# Patient Record
Sex: FEMALE | Race: WHITE | NOT HISPANIC OR LATINO | URBAN - METROPOLITAN AREA
[De-identification: names, ages, dates, MRNs, and addresses within clinical notes are randomized per-mention and may not be internally consistent; named-entity substitution may affect disease eponyms.]

---

## 2023-06-03 LAB — GROUP B STREP SCREEN: NORMAL

## 2023-12-08 ENCOUNTER — TELEPHONE (OUTPATIENT)
Dept: OBSTETRICS AND GYNECOLOGY | Facility: CLINIC | Age: 33
End: 2023-12-08

## 2023-12-11 ENCOUNTER — OFFICE VISIT (OUTPATIENT)
Dept: OBSTETRICS AND GYNECOLOGY | Facility: CLINIC | Age: 33
End: 2023-12-11
Payer: COMMERCIAL

## 2023-12-11 VITALS
DIASTOLIC BLOOD PRESSURE: 80 MMHG | WEIGHT: 164 LBS | BODY MASS INDEX: 22.21 KG/M2 | HEIGHT: 72 IN | SYSTOLIC BLOOD PRESSURE: 120 MMHG

## 2023-12-11 DIAGNOSIS — O92.70 LACTATION PROBLEM (HHS-HCC): Primary | ICD-10-CM

## 2023-12-11 PROCEDURE — 1036F TOBACCO NON-USER: CPT | Performed by: ADVANCED PRACTICE MIDWIFE

## 2023-12-11 PROCEDURE — 99213 OFFICE O/P EST LOW 20 MIN: CPT | Performed by: ADVANCED PRACTICE MIDWIFE

## 2023-12-11 ASSESSMENT — ENCOUNTER SYMPTOMS
GASTROINTESTINAL NEGATIVE: 0
CARDIOVASCULAR NEGATIVE: 0
RESPIRATORY NEGATIVE: 0
ENDOCRINE NEGATIVE: 0
NEUROLOGICAL NEGATIVE: 0
ALLERGIC/IMMUNOLOGIC NEGATIVE: 0
CONSTITUTIONAL NEGATIVE: 0
PSYCHIATRIC NEGATIVE: 0
MUSCULOSKELETAL NEGATIVE: 0
EYES NEGATIVE: 0
HEMATOLOGIC/LYMPHATIC NEGATIVE: 0

## 2023-12-11 NOTE — PROGRESS NOTES
GYN Problem note  Mony Campbell  is a 33 y.o. who presents with   Chief Complaint   Patient presents with    Breast Problem     Had a baby in June, has been breastfeeding. Nipple is turning yellow. Noticed on Thursday. No itching or pain. No issues with breast feeding.        Pt reports a hx of bilateral breast implants.      Physical Exam   Physical Exam  Constitutional:       Appearance: Normal appearance.   HENT:      Head: Normocephalic and atraumatic.   Cardiovascular:      Rate and Rhythm: Normal rate.   Pulmonary:      Effort: Pulmonary effort is normal.   Chest:   Breasts:     Right: Skin change present.      Left: Normal.          Comments: Pale yellow discoloration   Possibly bruised area on proximal side of right areola    Musculoskeletal:         General: Normal range of motion.      Cervical back: Normal range of motion.   Skin:     General: Skin is warm.   Neurological:      General: No focal deficit present.      Mental Status: She is alert and oriented to person, place, and time.   Psychiatric:         Mood and Affect: Mood normal.         Behavior: Behavior normal.        Visit Vitals  /80            Assessment/Plan   Discoloration of areola  Will continue to monitor (picture taken with pt's phone)  If not any worse (bigger, darker, more defined) in next 5 days, continue to monitor  If worse, call office, will order ultrasound.

## 2023-12-27 ENCOUNTER — OFFICE VISIT (OUTPATIENT)
Dept: VASCULAR SURGERY | Facility: CLINIC | Age: 33
End: 2023-12-27
Payer: COMMERCIAL

## 2023-12-27 VITALS
BODY MASS INDEX: 22.38 KG/M2 | WEIGHT: 163.9 LBS | SYSTOLIC BLOOD PRESSURE: 134 MMHG | DIASTOLIC BLOOD PRESSURE: 82 MMHG | HEART RATE: 109 BPM

## 2023-12-27 DIAGNOSIS — I86.8: Primary | ICD-10-CM

## 2023-12-27 PROCEDURE — 99204 OFFICE O/P NEW MOD 45 MIN: CPT | Performed by: SURGERY

## 2023-12-27 PROCEDURE — 1036F TOBACCO NON-USER: CPT | Performed by: SURGERY

## 2023-12-27 ASSESSMENT — ENCOUNTER SYMPTOMS
DEPRESSION: 0
LOSS OF SENSATION IN FEET: 0
OCCASIONAL FEELINGS OF UNSTEADINESS: 0

## 2023-12-27 NOTE — PROGRESS NOTES
NPV REASON: vaginal varicosity     CURRENT ENCOUNTER:  Mony Campbell is 33 y.o. female here for follow up of vaginal varicosity.     Engorged with pregnancy - had some scabs and bleeding over the prominent areas. Compared it to a nose bleed type rate; held pressure and ultimately stops - had 2-3 times of these events with last pregnancy - now has improved a bit now.     No bleeding since delivery  Maybe started as a trauma - in 4th grade - trauma with a chair to perineum and thinks noted some changes since that time.   During menstrual cycle - hurts more  Sometimes clothing irritates it or increased activity -  Occassional with intercourse but no post-coital pain.   No pelvic pain    First child was 2020 - .     Last delivery was  2023  Repeat   Discharged POD #2    Remains breast feeding and part formula;   Wishes for more children - aiming for 4    PastMedHX:  No past medical history on file.    Meds:   No current outpatient medications on file.    Allergies:   Allergies   Allergen Reactions    Gluten GI Upset    Latex Swelling       ROS:  Review of Systems   Otherwise unremarkable    Objective:  Vitals:  Vitals:    23 0918   BP: 134/82   Pulse: 109        Exam:  No distress  Breathing comfortably  Abd soft, nt, nd  Legs wihtout edema  Feet warm and well perfused  B/l radial pulses intact and symmetric  Neuro intact x 4  Healed c -section scar  No other vaginal external abnormalities  apart from that to right labia    Physical Exam  Exam conducted with a chaperone present.   Genitourinary:     Exam position: Lithotomy position.      Labia:         Right: Lesion present.          Labs:  Lab Results   Component Value Date    WBC 10.9 2023    WBC CANCELED 2023    WBC 6.8 2023    HGB 9.1 (L) 2023    HGB CANCELED 2023    HGB 11.2 (L) 2023    HCT 29.0 (L) 2023    HCT CANCELED 2023    HCT 35.4 (L) 2023    MCV 94 2023    MCV  "CANCELED 06/21/2023    MCV 94 06/20/2023     (L) 06/21/2023     No results found for: \"CREATININE\", \"BUN\", \"NA\", \"K\", \"CL\", \"CO2\"      Imaging:  None at this time    Assessment & Plan:  Mony Campbell is 33 y.o. female with history of bleeding right labial varicosities with 2nd pregnancy who presents for evaluation.       1) WE WILL proceed with a trans-abdominal duplex/ultrasound to evaluate for pelvic venous disease  2) start a daily regimen for constipation with a mix of miralax and increase fiber foods  3) I can see you back with the study on the same day to discuss results/plans      Froilan Rockwell MD, MHS, RPVI  , [facility] [facility] [employer]  Director, Center for Comprehensive Venous Care, [facility] [place]  Co-Director, Vascular Laboratories, [facility] [place]  Division of Vascular Surgery and Endovascular Therapy  [facility]            "

## 2023-12-27 NOTE — PATIENT INSTRUCTIONS
It was a pleasure taking care of you today and appreciate your seeing us at our [facility].     Today's plan is as follows:  1) WE WILL proceed with a trans-abdominal duplex/ultrasound to evaluate for pelvic venous disease  2) start a daily regimen for constipation with a mix of miralax and increase fiber foods  3) I can see you back with the study on the same day to discuss results/plans      Please call the office with any questions at [phone].   You can speak to our secretaries or our clinical nurses for specific questions.   For [facility] specific questions, you can also call [phone] or email at [email or web address]  If you need coordinating your appointments and testing you can do these at the  or by calling my office shortly after your visit.

## 2024-01-10 ENCOUNTER — APPOINTMENT (OUTPATIENT)
Dept: VASCULAR MEDICINE | Facility: CLINIC | Age: 34
End: 2024-01-10
Payer: COMMERCIAL

## 2024-02-27 ENCOUNTER — OFFICE VISIT (OUTPATIENT)
Dept: VASCULAR SURGERY | Facility: CLINIC | Age: 34
End: 2024-02-27
Payer: COMMERCIAL

## 2024-02-27 ENCOUNTER — APPOINTMENT (OUTPATIENT)
Dept: VASCULAR MEDICINE | Facility: CLINIC | Age: 34
End: 2024-02-27
Payer: COMMERCIAL

## 2024-02-27 ENCOUNTER — HOSPITAL ENCOUNTER (OUTPATIENT)
Dept: VASCULAR MEDICINE | Facility: CLINIC | Age: 34
Discharge: HOME | End: 2024-02-27
Payer: COMMERCIAL

## 2024-02-27 ENCOUNTER — APPOINTMENT (OUTPATIENT)
Dept: VASCULAR SURGERY | Facility: CLINIC | Age: 34
End: 2024-02-27
Payer: COMMERCIAL

## 2024-02-27 VITALS — SYSTOLIC BLOOD PRESSURE: 124 MMHG | HEART RATE: 105 BPM | DIASTOLIC BLOOD PRESSURE: 68 MMHG | RESPIRATION RATE: 18 BRPM

## 2024-02-27 DIAGNOSIS — I86.8: Primary | ICD-10-CM

## 2024-02-27 DIAGNOSIS — I86.8: ICD-10-CM

## 2024-02-27 DIAGNOSIS — I83.891 VARICOSE VEINS OF RIGHT LOWER EXTREMITY WITH OTHER COMPLICATIONS: ICD-10-CM

## 2024-02-27 DIAGNOSIS — I83.899 BLEEDING FROM VARICOSE VEIN: ICD-10-CM

## 2024-02-27 PROCEDURE — 99214 OFFICE O/P EST MOD 30 MIN: CPT | Performed by: SURGERY

## 2024-02-27 PROCEDURE — 93978 VASCULAR STUDY: CPT | Performed by: SURGERY

## 2024-02-27 PROCEDURE — 1036F TOBACCO NON-USER: CPT | Performed by: SURGERY

## 2024-02-27 PROCEDURE — 93978 VASCULAR STUDY: CPT

## 2024-02-27 PROCEDURE — 99214 OFFICE O/P EST MOD 30 MIN: CPT | Mod: 25 | Performed by: SURGERY

## 2024-02-27 ASSESSMENT — ENCOUNTER SYMPTOMS
LOSS OF SENSATION IN FEET: 0
OCCASIONAL FEELINGS OF UNSTEADINESS: 0
DEPRESSION: 0

## 2024-02-27 NOTE — PROGRESS NOTES
"F/U REASON: Right labial varicosities, pain, history of bleeding    CURRENT ENCOUNTER:  Mony Campbell is 33 y.o. female here for follow up of Right labial varicosities, pain, history of bleeding.    Patient here for follow up. Had her duplex without any pelvic source to her vaginal vv - remains with fullness and may be a bit more prominent. No interval bleeding  Did discuss sclerotherapy, fluoro guided in the OR with need to proceed once she is done with breast feeding. She continues to wish for a total of 4 children - currently has 2  and thinking maybe summer (July) maybe a time for her and her spouse to plan the next phase of their family growth.     She wishes to discuss options for therapy with  before making a decision.     Meds:   No current outpatient medications on file.    Allergies:   Allergies   Allergen Reactions    Gluten GI Upset    Latex Swelling       ROS:  Review of Systems    otherwise unremarkable    Objective:  Vitals:  Vitals:    02/27/24 1028   BP: 124/68   Pulse: 105   Resp: 18        Exam:  Labial vv on right with clustered pattern.        Labs:  Lab Results   Component Value Date    WBC 10.9 06/21/2023    WBC CANCELED 06/21/2023    WBC 6.8 06/20/2023    HGB 9.1 (L) 06/21/2023    HGB CANCELED 06/21/2023    HGB 11.2 (L) 06/20/2023    HCT 29.0 (L) 06/21/2023    HCT CANCELED 06/21/2023    HCT 35.4 (L) 06/20/2023    MCV 94 06/21/2023    MCV CANCELED 06/21/2023    MCV 94 06/20/2023     (L) 06/21/2023     No results found for: \"CREATININE\", \"BUN\", \"NA\", \"K\", \"CL\", \"CO2\"      Imaging:  Reviewed vascular lab;   QOL scales also filled out and scanned in     Assessment & Plan:  Mony Campbell is 33 y.o. female here for follow up of Right labial varicosities, pain, history of bleeding.    Patient here for follow up. Had her duplex without any pelvic source to her vaginal vv - remains with fullness and may be a bit more prominent. No interval bleeding  Did discuss sclerotherapy, " fluoro guided in the OR with need to proceed once she is done with breast feeding. She continues to wish for a total of 4 children - currently has 2  and thinking maybe summer (July) maybe a time for her and her spouse to plan the next phase of their family growth.     She wishes to discuss options for therapy with  before making a decision.     Froilan Rockwell MD, MHS, RPVI  , [facility] [employer]  Director, Center for Comprehensive Venous Care, [facility] [place]  Co-Director, Vascular Laboratories, [facility] [place]  Division of Vascular Surgery and Endovascular Therapy  [facility]

## 2024-02-28 ENCOUNTER — APPOINTMENT (OUTPATIENT)
Dept: VASCULAR SURGERY | Facility: CLINIC | Age: 34
End: 2024-02-28
Payer: COMMERCIAL

## 2024-02-28 ENCOUNTER — APPOINTMENT (OUTPATIENT)
Dept: VASCULAR MEDICINE | Facility: CLINIC | Age: 34
End: 2024-02-28
Payer: COMMERCIAL

## 2024-03-08 ENCOUNTER — OFFICE VISIT (OUTPATIENT)
Dept: OBSTETRICS AND GYNECOLOGY | Facility: CLINIC | Age: 34
End: 2024-03-08
Payer: COMMERCIAL

## 2024-03-08 VITALS
HEIGHT: 71 IN | SYSTOLIC BLOOD PRESSURE: 116 MMHG | DIASTOLIC BLOOD PRESSURE: 80 MMHG | WEIGHT: 163.6 LBS | BODY MASS INDEX: 22.9 KG/M2

## 2024-03-08 DIAGNOSIS — Z01.419 ENCOUNTER FOR ANNUAL ROUTINE GYNECOLOGICAL EXAMINATION: Primary | ICD-10-CM

## 2024-03-08 PROCEDURE — 99395 PREV VISIT EST AGE 18-39: CPT | Performed by: OBSTETRICS & GYNECOLOGY

## 2024-03-08 PROCEDURE — 1036F TOBACCO NON-USER: CPT | Performed by: OBSTETRICS & GYNECOLOGY

## 2024-03-08 ASSESSMENT — PATIENT HEALTH QUESTIONNAIRE - PHQ9
1. LITTLE INTEREST OR PLEASURE IN DOING THINGS: NOT AT ALL
2. FEELING DOWN, DEPRESSED OR HOPELESS: NOT AT ALL
SUM OF ALL RESPONSES TO PHQ9 QUESTIONS 1 & 2: 0

## 2024-03-08 NOTE — PROGRESS NOTES
"Annual Exam    Pap: 2022 nml  Angelique: Never  LMP: 2024  CC: wants to try for another baby later this year, has questions.     Carol Francisco MA II     GYN ANNUAL EXAM    SUBJECTIVE    Mony Campbell is a 33 y.o. female who presents for annual exam today.  Her periods are monthly and regular.  She is using condoms for contraception and is happy with this.  She has no complaints today.      PMH - None     PSH - c/s x 2     OB history -   OB History    Para Term  AB Living   2 2 2     2   SAB IAB Ectopic Multiple Live Births           2      # Outcome Date GA Lbr Armin/2nd Weight Sex Delivery Anes PTL Lv   2 Term      CS-LTranv   KYARA   1 Term      CS-LTranv   KYARA     Last pap -   Normal HPV Not done-      Last mammogram - not indicated     Family history of breast or ovarian cancer - none     OBJECTIVE  /80   Ht 1.803 m (5' 11\")   Wt 74.2 kg (163 lb 9.6 oz)   LMP 2024 (Exact Date)   Breastfeeding No   BMI 22.82 kg/m²     General Appearance   - consistent with stated age, well groomed and cooperative    Integumentary  - skin warm and dry without rash    Head and Neck  - normalocephalic and neck supple    Chest and Lung Exam  - normal breathing effort, no respiratory distress    Breast  - symmetry noted, no mass palpable, no skin change and no nipple discharge.    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  female who presents for annual exam.       Actions performed during this visit include:  - Clinical breast exam  - Clinical pelvic exam  - Pap- UTD - will be due again next year.  - STI " screening  - Declines  - Right vulvar congenital venous malformation - saw vascular surgery who offered her treatment.  At this time, she likely plans to defer treatment until she has completed her childbearing.  Plans to have 1-2 more children.      Please return for your next visit in 1 year or sooner as needed.     Sherly Barba MD

## 2024-03-17 ENCOUNTER — APPOINTMENT (OUTPATIENT)
Dept: URGENT CARE | Facility: CLINIC | Age: 34
End: 2024-03-17
Payer: COMMERCIAL

## 2025-04-03 ENCOUNTER — OFFICE VISIT (OUTPATIENT)
Dept: URGENT CARE | Age: 35
End: 2025-04-03
Payer: COMMERCIAL

## 2025-04-03 VITALS
TEMPERATURE: 98.1 F | BODY MASS INDEX: 21.62 KG/M2 | HEART RATE: 93 BPM | OXYGEN SATURATION: 98 % | SYSTOLIC BLOOD PRESSURE: 127 MMHG | DIASTOLIC BLOOD PRESSURE: 82 MMHG | WEIGHT: 155 LBS | RESPIRATION RATE: 16 BRPM

## 2025-04-03 DIAGNOSIS — R10.9 STOMACH PAIN: ICD-10-CM

## 2025-04-03 DIAGNOSIS — J21.0 RSV (ACUTE BRONCHIOLITIS DUE TO RESPIRATORY SYNCYTIAL VIRUS): ICD-10-CM

## 2025-04-03 DIAGNOSIS — J34.9 SINUS PROBLEM: ICD-10-CM

## 2025-04-03 DIAGNOSIS — B34.8 RHINOVIRUS: Primary | ICD-10-CM

## 2025-04-03 LAB
POC APPEARANCE, URINE: CLEAR
POC BILIRUBIN, URINE: NEGATIVE
POC BLOOD, URINE: NEGATIVE
POC COLOR, URINE: YELLOW
POC CORONAVIRUS SARS-COV-2 PCR: NEGATIVE
POC GLUCOSE, URINE: NEGATIVE MG/DL
POC HUMAN RHINOVIRUS PCR: POSITIVE
POC INFLUENZA A VIRUS PCR: NEGATIVE
POC INFLUENZA B VIRUS PCR: NEGATIVE
POC KETONES, URINE: NEGATIVE MG/DL
POC LEUKOCYTES, URINE: NEGATIVE
POC NITRITE,URINE: NEGATIVE
POC PH, URINE: 6 PH
POC PROTEIN, URINE: NEGATIVE MG/DL
POC RESPIRATORY SYNCYTIAL VIRUS PCR: POSITIVE
POC SPECIFIC GRAVITY, URINE: 1.02
POC UROBILINOGEN, URINE: 0.2 EU/DL
PREGNANCY TEST URINE, POC: NEGATIVE

## 2025-04-03 PROCEDURE — 99214 OFFICE O/P EST MOD 30 MIN: CPT | Performed by: FAMILY MEDICINE

## 2025-04-03 PROCEDURE — 81003 URINALYSIS AUTO W/O SCOPE: CPT | Performed by: FAMILY MEDICINE

## 2025-04-03 PROCEDURE — 87631 RESP VIRUS 3-5 TARGETS: CPT | Performed by: FAMILY MEDICINE

## 2025-04-03 PROCEDURE — 81025 URINE PREGNANCY TEST: CPT | Performed by: FAMILY MEDICINE

## 2025-04-03 PROCEDURE — 1036F TOBACCO NON-USER: CPT | Performed by: FAMILY MEDICINE

## 2025-04-03 RX ORDER — BENZONATATE 200 MG/1
200 CAPSULE ORAL 3 TIMES DAILY PRN
Qty: 21 CAPSULE | Refills: 0 | Status: SHIPPED | OUTPATIENT
Start: 2025-04-03 | End: 2025-05-03

## 2025-04-03 RX ORDER — IPRATROPIUM BROMIDE 42 UG/1
2 SPRAY, METERED NASAL 3 TIMES DAILY
Qty: 15 ML | Refills: 0 | Status: SHIPPED | OUTPATIENT
Start: 2025-04-03 | End: 2025-04-08

## 2025-04-03 ASSESSMENT — ENCOUNTER SYMPTOMS
HEADACHES: 1
ABDOMINAL PAIN: 1
SINUS COMPLAINT: 1
COUGH: 1

## 2025-04-03 NOTE — PROGRESS NOTES
Subjective   Patient ID: Mony Campbell is a 34 y.o. female. They present today with a chief complaint of Sinus Problem, Nasal Congestion, Cough, Headache, Earache, and Abdominal Pain.    History of Present Illness  34-year-old female presents with 5 to 7 days initially some malaise and fatigue within the last 2 days having nasal drainage rhinorrhea.  Highest temperature was 99.  Feeling hot and cold.  Some early morning sore throat.  Some laryngitis.  Rare productive cough.  Kids been sick as well.  She not take any medications for symptoms.  Last menstrual cycle was 2.5 weeks ago as her and her  are actively trying to conceive      Sinus Problem  Associated symptoms: abdominal pain, cough, ear pain and headaches    Cough  Associated symptoms include ear pain and headaches.   Headache  Associated symptoms: abdominal pain, cough and ear pain    Earache   Associated symptoms include abdominal pain, coughing and headaches.   Abdominal Pain  Associated symptoms include headaches.       Past Medical History  Allergies as of 2025 - Reviewed 2025   Allergen Reaction Noted    Gluten GI Upset 2023    Latex Swelling 2023       (Not in a hospital admission)       No past medical history on file.    Past Surgical History:   Procedure Laterality Date    OTHER SURGICAL HISTORY  2022     section        reports that she has never smoked. She has never used smokeless tobacco. She reports that she does not drink alcohol and does not use drugs.    Review of Systems  Review of Systems   HENT:  Positive for ear pain.    Respiratory:  Positive for cough.    Gastrointestinal:  Positive for abdominal pain.   Neurological:  Positive for headaches.                                  Objective    Vitals:    25 1714   BP: 127/82   Pulse: 93   Resp: 16   Temp: 36.7 °C (98.1 °F)   SpO2: 98%   Weight: 70.3 kg (155 lb)     No LMP recorded.    Physical Exam    General- No apparent distress,  well appearing  Cardiovascular- regular rate and rhythm, no gallops, murmurs or rubs  Lungs- clear to auscultation bilaterally, no wheezing or rhonchi  ENT- Normal bilateral tympanic membranes, normal pharynx      Procedures    Point of Care Test & Imaging Results from this visit  Results for orders placed or performed in visit on 04/03/25   POCT UA Automated manually resulted   Result Value Ref Range    POC Color, Urine Yellow Straw, Yellow, Light-Yellow    POC Appearance, Urine Clear Clear    POC Glucose, Urine NEGATIVE NEGATIVE mg/dl    POC Bilirubin, Urine NEGATIVE NEGATIVE    POC Ketones, Urine NEGATIVE NEGATIVE mg/dl    POC Specific Gravity, Urine 1.025 1.005 - 1.035    POC Blood, Urine NEGATIVE NEGATIVE    POC PH, Urine 6.0 No Reference Range Established PH    POC Protein, Urine NEGATIVE NEGATIVE mg/dl    POC Urobilinogen, Urine 0.2 0.2, 1.0 EU/DL    Poc Nitrite, Urine NEGATIVE NEGATIVE    POC Leukocytes, Urine NEGATIVE NEGATIVE      Imaging  No results found.    Cardiology, Vascular, and Other Imaging  No other imaging results found for the past 2 days      Diagnostic study results (if any) were reviewed by Tobias Causey MD.    Assessment/Plan   Allergies, medications, history, and pertinent labs/EKGs/Imaging reviewed by Tobias Causey MD.     Medical Decision Making  Rhinorrhea- unfortunately patient tested positive for both RSV and rhinovirus today.  Advised supportive care with Tessalon and nasal ipratropium follow-up with PCP.    Orders and Diagnoses  Diagnoses and all orders for this visit:  Sinus problem  -     POCT SPOTFIRE R/ST Panel Mini w/COVID ([facility]) manually resulted  Stomach pain  -     POCT UA Automated manually resulted  -     POCT pregnancy, urine manually resulted      Medical Admin Record      Patient disposition: Home    Electronically signed by Tobias Causey MD  5:47 PM

## 2025-04-03 NOTE — LETTER
April 3, 2025     Patient: Mony Campbell   YOB: 1990   Date of Visit: 4/3/2025       To Whom It May Concern:    It is my medical opinion that Mony Campbell may return to work on 4/5/25 .    If you have any questions or concerns, please don't hesitate to call.         Sincerely,        Tobias Causey MD    CC: No Recipients

## 2025-05-16 ENCOUNTER — APPOINTMENT (OUTPATIENT)
Dept: OBSTETRICS AND GYNECOLOGY | Facility: CLINIC | Age: 35
End: 2025-05-16
Payer: COMMERCIAL

## 2025-05-16 VITALS — WEIGHT: 152 LBS | SYSTOLIC BLOOD PRESSURE: 130 MMHG | DIASTOLIC BLOOD PRESSURE: 84 MMHG | BODY MASS INDEX: 21.2 KG/M2

## 2025-05-16 DIAGNOSIS — Z98.891 HISTORY OF 2 CESAREAN SECTIONS: ICD-10-CM

## 2025-05-16 DIAGNOSIS — Z3A.09 9 WEEKS GESTATION OF PREGNANCY (HHS-HCC): Primary | ICD-10-CM

## 2025-05-16 DIAGNOSIS — Q27.9 VENOUS MALFORMATION (HHS-HCC): ICD-10-CM

## 2025-05-16 ASSESSMENT — EDINBURGH POSTNATAL DEPRESSION SCALE (EPDS)
I HAVE BEEN SO UNHAPPY THAT I HAVE BEEN CRYING: NO, NEVER
THE THOUGHT OF HARMING MYSELF HAS OCCURRED TO ME: NEVER
I HAVE BEEN SO UNHAPPY THAT I HAVE HAD DIFFICULTY SLEEPING: NOT AT ALL
I HAVE FELT SAD OR MISERABLE: NO, NOT AT ALL
I HAVE BEEN ABLE TO LAUGH AND SEE THE FUNNY SIDE OF THINGS: AS MUCH AS I ALWAYS COULD
I HAVE LOOKED FORWARD WITH ENJOYMENT TO THINGS: AS MUCH AS I EVER DID
I HAVE BLAMED MYSELF UNNECESSARILY WHEN THINGS WENT WRONG: NO, NEVER
I HAVE BEEN ANXIOUS OR WORRIED FOR NO GOOD REASON: YES, SOMETIMES
THINGS HAVE BEEN GETTING ON TOP OF ME: NO, I HAVE BEEN COPING AS WELL AS EVER
I HAVE FELT SCARED OR PANICKY FOR NO GOOD REASON: NO, NOT AT ALL
TOTAL SCORE: 2

## 2025-05-16 NOTE — PROGRESS NOTES
Initial OB visit.  LMP: 3/12/2025  C/O: Had light spotting around a week after her last period.   Nausea, no vomiting.  SOB, fatigued easily.    Routine prenatal visit     Subjective    HPI:  34 y.o.  at 9.2 weeks gestational age by sure LMP.  Well-known to me from prior pregnancies.  Planned and desired pregnancy.  USN done today - CRL c/w LMP dating.  EDC per LMP - .  PMH uncomplicated. OB history significant for 2 previous  sections.  Planning RCS.  Has congenital vulvar varicosity in right labia majora - has seen vascular and will likely have it treated once she has completed childbearing.   Has nausea but tolerating.  Taking OTC PNV.    BMI today Body mass index is 21.2 kg/m². Discussed optimal weight gain in pregnancy.  Discussed genetic screening options and carrier screening - declines.  Discussed ASA - will likely decline but will talk about it again next visit.  13 week USN discussed.  Order placed and she will call to schedule. Pt aware of collaborative care model and group practice from prior pregnancy.  Questions answered.    Objective    Vital Signs  /84   Wt 68.9 kg (152 lb)   LMP 2025   BMI 21.20 kg/m²     Mony Campbell is a 34 y.o. yo  at 9w2d here for the following concerns which we addressed today:     Medical Problems       Problem List       9 weeks gestation of pregnancy ([facility])    Overview Addendum 2025  4:45 PM by Sherly Barba MD   Desired provider in labor: [] CNM  [x] Physician   [] Either Acceptable  [x] Blood Products: [x] Yes, accepts [] No, needs counseling  [x] Initial BMI: 21   [] Prenatal Labs: <> next visit   [x] Cervical Cancer Screening up to date: 2022 - normal, negative HPV   [x] Rh status: positive   [x] Screen for IPV and Substance Use Risk  [x] Genetic Screening (cfDNA):  Declines   [] First Trimester Anatomy Screen (11-13.6 wks): <> order placed and she will call to schedule   [] Baby ASA: Discussed - pt will  likely decline - discuss again next visit   [x] Pregnancy dated by: LMP c/w 9 week USN     [] Anatomy US: (19-20 wks)  [] Federal Sterilization consent signed (if indicated):  [] 1hr GCT at 24-28wks:  [] Rhogam (if indicated):   [] Fetal Surveillance (if indicated):  [] Tdap (27-32 wks, may be given up to 36 wks if initial window missed):   [] RSV (32-36 wks) (Sept. to end ):     [] Feeding Intentions:  [] Postpartum Birth control method:   [] GBS at 36 - 37 wks:  [] 39 weeks discussion of IOL vs. Expectant management:  [] Mode of delivery ( anticipated ):           History of 2  sections    Overview Signed 2025  4:44 PM by Sherly Barba MD   <> schedule RCS          Venous malformation ([facility])    Overview Signed 2025  4:45 PM by Sherly Barba MD   - right labia majora   - saw vascular - will likely pursue treatment once she has completed childbearing              Follow up in 4 week(s).

## 2025-05-18 LAB
BACTERIA UR CULT: NORMAL
C TRACH RRNA SPEC QL NAA+PROBE: NOT DETECTED
N GONORRHOEA RRNA SPEC QL NAA+PROBE: NOT DETECTED
QUEST GC CT AMPLIFIED (ALWAYS MESSAGE): NORMAL

## 2025-05-19 ENCOUNTER — TELEPHONE (OUTPATIENT)
Dept: OBSTETRICS AND GYNECOLOGY | Facility: HOSPITAL | Age: 35
End: 2025-05-19
Payer: COMMERCIAL

## 2025-05-19 NOTE — TELEPHONE ENCOUNTER
Patient paged answering service with bleeding. Briefly, 34 year old  at 9w5d by LMP c/w office scan three days ago. Reports spotting when she wiped just now. Did have intercourse last night. No cramping. Confirmed IUP with [facility] three days ago. Rh positive. Okay to monitor at home for now with bleeding precautions provided. Encouraged to page back should bleeding persist/become heavier. All questions answered    Berenice Morris MD

## 2025-06-13 ENCOUNTER — APPOINTMENT (OUTPATIENT)
Dept: OBSTETRICS AND GYNECOLOGY | Facility: CLINIC | Age: 35
End: 2025-06-13
Payer: COMMERCIAL

## 2025-06-13 VITALS — SYSTOLIC BLOOD PRESSURE: 120 MMHG | DIASTOLIC BLOOD PRESSURE: 84 MMHG | BODY MASS INDEX: 21.34 KG/M2 | WEIGHT: 153 LBS

## 2025-06-13 DIAGNOSIS — Z3A.13 13 WEEKS GESTATION OF PREGNANCY (HHS-HCC): Primary | ICD-10-CM

## 2025-06-13 DIAGNOSIS — Z34.91 PRENATAL CARE IN FIRST TRIMESTER, UNSPECIFIED GRAVIDITY: ICD-10-CM

## 2025-06-13 DIAGNOSIS — Q27.9 VENOUS MALFORMATION (HHS-HCC): ICD-10-CM

## 2025-06-13 DIAGNOSIS — Z98.891 HISTORY OF 2 CESAREAN SECTIONS: ICD-10-CM

## 2025-06-13 PROCEDURE — 0501F PRENATAL FLOW SHEET: CPT | Performed by: OBSTETRICS & GYNECOLOGY

## 2025-06-13 NOTE — PROGRESS NOTES
Routine prenatal visit     Subjective    HPI:  No complaints today.  Has noticed a varicose vein in her left leg.  Has not scheduled a 13 week USN - reminded her about this and she will try to call and get this scheduled ASAP.  Routine labs today.  Declines carrier/cfDNA screening.  Discussed recommendation for ASA for preeclampsia prevention - she declines.     Objective    Vital Signs  /84   Wt 69.4 kg (153 lb)   LMP 2025   BMI 21.34 kg/m²     Mony Campbell is a 34 y.o. yo  at 13w2d here for the following concerns which we addressed today:     Medical Problems       Problem List       13 weeks gestation of pregnancy ([facility])    Overview Addendum 2025 10:35 AM by Sherly Barba MD   Desired provider in labor: [] CNM  [x] Physician   [] Either Acceptable  [x] Blood Products: [x] Yes, accepts [] No, needs counseling  [x] Initial BMI: 21   [] Prenatal Labs: sent today   [x] Cervical Cancer Screening up to date: 2022 - normal, negative HPV   [x] Rh status: positive   [x] Screen for IPV and Substance Use Risk  [x] Genetic Screening (cfDNA):  Declines   [] First Trimester Anatomy Screen (11-13.6 wks): Pt still has not scheduled --> discussed again today and she will call to schedule ASAP   [x] Baby ASA: Recommended - pt declines   [x] Pregnancy dated by: LMP c/w 9 week USN     [] Anatomy US: (19-20 wks)  [] Federal Sterilization consent signed (if indicated):  [] 1hr GCT at 24-28wks:  [] Rhogam (if indicated):   [] Fetal Surveillance (if indicated):  [] Tdap (27-32 wks, may be given up to 36 wks if initial window missed):   [] RSV (32-36 wks) (Sept. to end of ):     [] Feeding Intentions:  [] Postpartum Birth control method:   [] GBS at 36 - 37 wks:  [] 39 weeks discussion of IOL vs. Expectant management:  [] Mode of delivery ( anticipated ):           History of 2  sections    Overview Signed 2025  4:44 PM by Sherly Barba MD   <> schedule RCS           Venous malformation ([facility])    Overview Signed 5/16/2025  4:45 PM by Sherly Barba MD   - right labia majora   - saw vascular - will likely pursue treatment once she has completed childbearing              Follow up in 4 week(s).

## 2025-06-17 ENCOUNTER — LAB (OUTPATIENT)
Dept: LAB | Facility: HOSPITAL | Age: 35
End: 2025-06-17
Payer: COMMERCIAL

## 2025-06-17 ENCOUNTER — HOSPITAL ENCOUNTER (OUTPATIENT)
Dept: RADIOLOGY | Facility: CLINIC | Age: 35
Discharge: HOME | End: 2025-06-17
Payer: COMMERCIAL

## 2025-06-17 DIAGNOSIS — Z34.91 ENCOUNTER FOR SUPERVISION OF NORMAL PREGNANCY, UNSPECIFIED, FIRST TRIMESTER: Primary | ICD-10-CM

## 2025-06-17 DIAGNOSIS — Z3A.13 13 WEEKS GESTATION OF PREGNANCY (HHS-HCC): ICD-10-CM

## 2025-06-17 DIAGNOSIS — Z3A.09 9 WEEKS GESTATION OF PREGNANCY (HHS-HCC): ICD-10-CM

## 2025-06-17 LAB
ABO GROUP (TYPE) IN BLOOD: NORMAL
ANTIBODY SCREEN: NORMAL
ERYTHROCYTE [DISTWIDTH] IN BLOOD BY AUTOMATED COUNT: 13.2 % (ref 11.5–14.5)
HCT VFR BLD AUTO: 38.4 % (ref 36–46)
HGB BLD-MCNC: 12.7 G/DL (ref 12–16)
MCH RBC QN AUTO: 29.6 PG (ref 26–34)
MCHC RBC AUTO-ENTMCNC: 33.1 G/DL (ref 32–36)
MCV RBC AUTO: 90 FL (ref 80–100)
NRBC BLD-RTO: 0 /100 WBCS (ref 0–0)
PLATELET # BLD AUTO: 182 X10*3/UL (ref 150–450)
RBC # BLD AUTO: 4.29 X10*6/UL (ref 4–5.2)
REFLEX ADDED, ANEMIA PANEL: NORMAL
RH FACTOR (ANTIGEN D): NORMAL
WBC # BLD AUTO: 7.6 X10*3/UL (ref 4.4–11.3)

## 2025-06-17 PROCEDURE — 76801 OB US < 14 WKS SINGLE FETUS: CPT | Performed by: MEDICAL GENETICS

## 2025-06-17 PROCEDURE — 76817 TRANSVAGINAL US OBSTETRIC: CPT | Performed by: MEDICAL GENETICS

## 2025-06-17 PROCEDURE — 36415 COLL VENOUS BLD VENIPUNCTURE: CPT

## 2025-06-17 PROCEDURE — 86901 BLOOD TYPING SEROLOGIC RH(D): CPT

## 2025-06-17 PROCEDURE — 86900 BLOOD TYPING SEROLOGIC ABO: CPT

## 2025-06-17 PROCEDURE — 85027 COMPLETE CBC AUTOMATED: CPT

## 2025-06-17 PROCEDURE — 76817 TRANSVAGINAL US OBSTETRIC: CPT

## 2025-06-17 PROCEDURE — 83021 HEMOGLOBIN CHROMOTOGRAPHY: CPT

## 2025-06-17 PROCEDURE — 76801 OB US < 14 WKS SINGLE FETUS: CPT

## 2025-06-17 PROCEDURE — 86850 RBC ANTIBODY SCREEN: CPT

## 2025-06-19 LAB
EST. AVERAGE GLUCOSE BLD GHB EST-MCNC: 100 MG/DL
EST. AVERAGE GLUCOSE BLD GHB EST-SCNC: 5.5 MMOL/L
HBA1C MFR BLD: 5.1 %
HBV CORE AB SERPL QL IA: NORMAL
HBV SURFACE AB SERPL IA-ACNC: 48 MIU/ML
HBV SURFACE AG SERPL QL IA: NORMAL
HCV AB SERPL QL IA: NORMAL
HEMOGLOBIN A2: 2.9 % (ref 2–3.5)
HEMOGLOBIN A: 96.6 % (ref 95.8–98)
HEMOGLOBIN F: 0.5 % (ref 0–2)
HEMOGLOBIN IDENTIFICATION INTERPRETATION: NORMAL
HIV 1+2 AB+HIV1 P24 AG SERPL QL IA: NORMAL
PATH REVIEW-HGB IDENTIFICATION: NORMAL
RUBV IGG SERPL IA-ACNC: 0.94 INDEX
T PALLIDUM AB SER QL IA: NORMAL

## 2025-06-24 PROBLEM — O09.899 RUBELLA NON-IMMUNE STATUS, ANTEPARTUM (HHS-HCC): Status: ACTIVE | Noted: 2025-06-24

## 2025-06-24 PROBLEM — Z28.39 RUBELLA NON-IMMUNE STATUS, ANTEPARTUM (HHS-HCC): Status: ACTIVE | Noted: 2025-06-24

## 2025-06-24 LAB
EST. AVERAGE GLUCOSE BLD GHB EST-MCNC: 100 MG/DL
EST. AVERAGE GLUCOSE BLD GHB EST-SCNC: 5.5 MMOL/L
HBA1C MFR BLD: 5.1 %
HBV CORE AB SERPL QL IA: NORMAL
HBV SURFACE AB SERPL IA-ACNC: 48 MIU/ML
HBV SURFACE AG SERPL QL IA: NORMAL
HCV AB SERPL QL IA: NORMAL
HIV 1+2 AB+HIV1 P24 AG SERPL QL IA: NORMAL
RUBV IGG SERPL IA-ACNC: 0.94 INDEX
T PALLIDUM AB SER QL IA: NEGATIVE

## 2025-07-11 ENCOUNTER — APPOINTMENT (OUTPATIENT)
Dept: OBSTETRICS AND GYNECOLOGY | Facility: CLINIC | Age: 35
End: 2025-07-11
Payer: COMMERCIAL

## 2025-07-11 VITALS — SYSTOLIC BLOOD PRESSURE: 115 MMHG | DIASTOLIC BLOOD PRESSURE: 75 MMHG | BODY MASS INDEX: 22.04 KG/M2 | WEIGHT: 158 LBS

## 2025-07-11 DIAGNOSIS — Z28.39 RUBELLA NON-IMMUNE STATUS, ANTEPARTUM (HHS-HCC): ICD-10-CM

## 2025-07-11 DIAGNOSIS — O09.899 RUBELLA NON-IMMUNE STATUS, ANTEPARTUM (HHS-HCC): ICD-10-CM

## 2025-07-11 DIAGNOSIS — Z98.891 HISTORY OF 2 CESAREAN SECTIONS: ICD-10-CM

## 2025-07-11 DIAGNOSIS — Z3A.17 17 WEEKS GESTATION OF PREGNANCY (HHS-HCC): Primary | ICD-10-CM

## 2025-07-11 PROCEDURE — 0501F PRENATAL FLOW SHEET: CPT | Performed by: OBSTETRICS & GYNECOLOGY

## 2025-07-11 NOTE — PROGRESS NOTES
Ob Visit  25     SUBJECTIVE      HPI: Mony Campbell is a 34 y.o.  at 17w2d here for RPNV.  She has no contractions, bleeding, or LOF. Reports no fetal movement yet. Patient reports no concerns.        OBJECTIVE  Visit Vitals  /75   Wt 71.7 kg (158 lb)   LMP 2025   BMI 22.04 kg/m²   OB Status Pregnant   Smoking Status Never   BSA 1.89 m²            ASSESSMENT & PLAN    Mony Campbell is a 34 y.o.  at 17w2d here for the following concerns we addressed today:    Problem List Items Addressed This Visit    None      Prenatal labs reviewed with patient per request  Anatomy US 25  Anticipatory guidance for next visit  RTC in 4 weeks      Adrianna Moreno MD

## 2025-07-25 ENCOUNTER — HOSPITAL ENCOUNTER (OUTPATIENT)
Dept: RADIOLOGY | Facility: CLINIC | Age: 35
Discharge: HOME | End: 2025-07-25
Payer: COMMERCIAL

## 2025-07-25 DIAGNOSIS — Z3A.09 9 WEEKS GESTATION OF PREGNANCY (HHS-HCC): ICD-10-CM

## 2025-07-25 PROCEDURE — 76805 OB US >/= 14 WKS SNGL FETUS: CPT

## 2025-07-30 ENCOUNTER — APPOINTMENT (OUTPATIENT)
Dept: DERMATOLOGY | Facility: CLINIC | Age: 35
End: 2025-07-30
Payer: COMMERCIAL

## 2025-07-30 DIAGNOSIS — L71.9 ROSACEA: Primary | ICD-10-CM

## 2025-07-30 PROCEDURE — 99204 OFFICE O/P NEW MOD 45 MIN: CPT | Performed by: DERMATOLOGY

## 2025-07-30 NOTE — PROGRESS NOTES
Subjective     Mony Campbell is a 34 y.o. female who presents for the following: Rash (New - Area(s) of concern: face, anterior neck that start apprx 2016 continuously. Burning, itching, white, draining pustule at times, flaking; symptoms fluctuate. Treated as Rosacea in the past with no improvement while using Metronidazole cream, Minocycline, sulfacetamide.).   She was seen 3/10/22 and treated with minocycline, metronidazole cream and when seen for follow up 4/21/22 and was improved.          Review of Systems:  No other skin or systemic complaints other than what is documented elsewhere in the note.    The following portions of the chart were reviewed this encounter and updated as appropriate:          Skin Cancer History  Biopsy Log Book  No skin cancers from Specimen Tracking.    Additional History      Specialty Problems    None       Objective   Well appearing patient in no apparent distress; mood and affect are within normal limits.    A focused skin examination was performed of the face. All findings within normal limits unless otherwise noted below.    Assessment/Plan   Skin Exam  1. ROSACEA  Head - Anterior (Face)  Mid face erythema with telangiectasias and scattered inflammatory papules on the glabella, nose  Perioral scaly patches  Erythematous patches on lateral cheeks  Scaling of the eyelid margins  Severely flaring rosacea, pt currently 20 weeks pregnant    The chronic and intermittently flaring nature of the skin condition was discussed with the patient today. Discussed common triggers associated with rosacea including sun exposure, spicy foods, alcohol, and stress. The various treatment options and risks and benefits reviewed.     Limited options due to pregnancy.    Start Benzoyl peroxide 4% cleanser (panoxyl) OTC daily, lather for 1-2 minutes then rinse once daily.    Start azelaic acid 20% cream apply to affected areas on the face 2x daily    Demodex likely contributory to eyelids - could  consider topical ivermectin after delivering baby.  Otherwise wash with [employer] baby shampoo    Due 12/2025 - advised to follow up 1/2026 or 2/2026 and we can discuss alternative topicals. Oral antibiotics still limited as she is planning on pregnancy.   - azelaic acid (Azelex) 20 % cream - Head - Anterior (Face) - Apply to affected areas on the face 2x daily

## 2025-07-30 NOTE — Clinical Note
Mid face erythema with telangiectasias and scattered inflammatory papules on the glabella, nose  Perioral scaly patches  Erythematous patches on lateral cheeks  Scaling of the eyelid margins

## 2025-08-08 ENCOUNTER — APPOINTMENT (OUTPATIENT)
Dept: OBSTETRICS AND GYNECOLOGY | Facility: CLINIC | Age: 35
End: 2025-08-08
Payer: COMMERCIAL

## 2025-08-08 VITALS — WEIGHT: 164.8 LBS | DIASTOLIC BLOOD PRESSURE: 71 MMHG | SYSTOLIC BLOOD PRESSURE: 112 MMHG | BODY MASS INDEX: 22.98 KG/M2

## 2025-08-08 DIAGNOSIS — Z3A.21 21 WEEKS GESTATION OF PREGNANCY (HHS-HCC): Primary | ICD-10-CM

## 2025-08-08 DIAGNOSIS — Q27.9 VENOUS MALFORMATION (HHS-HCC): ICD-10-CM

## 2025-08-08 DIAGNOSIS — O09.899 RUBELLA NON-IMMUNE STATUS, ANTEPARTUM (HHS-HCC): ICD-10-CM

## 2025-08-08 DIAGNOSIS — Z28.39 RUBELLA NON-IMMUNE STATUS, ANTEPARTUM (HHS-HCC): ICD-10-CM

## 2025-08-08 DIAGNOSIS — Z98.891 HISTORY OF 2 CESAREAN SECTIONS: ICD-10-CM

## 2025-08-08 PROCEDURE — 0501F PRENATAL FLOW SHEET: CPT | Performed by: OBSTETRICS & GYNECOLOGY

## 2025-08-08 NOTE — PROGRESS NOTES
Patient presents for OBFU  C/O: Discuss doing 7 days of Bgs instead of GTT. Discuss if benzoyl peroxide,azelaic acid and probiotic is safe in pregnancy.     Carol Francisco MA II    Routine prenatal visit     Subjective    HPI:  Routine OB follow up. No complaints today.  Had anatomy USN which was normal but incomplete - has follow up USN scheduled next week.   Plans to do BG checks instead of glucola which she also did in her last pregnancy.     Objective    Vital Signs  /71   Wt 74.8 kg (164 lb 12.8 oz)   LMP 2025   BMI 22.98 kg/m²     Mony Campbell is a 34 y.o. yo  at 21w2d here for the following concerns which we addressed today:     Medical Problems       Problem List       21 weeks gestation of pregnancy ([facility])    Overview Addendum 2025  5:14 PM by Sherly Barba MD   Desired provider in labor: [] CNM  [x] Physician   [] Either Acceptable  [x] Blood Products: [x] Yes, accepts [] No, needs counseling  [x] Initial BMI: 21   [x] Prenatal Labs:  UTD  [x] Cervical Cancer Screening up to date: 2022 - normal, negative HPV   [x] Rh status: positive   [x] Screen for IPV and Substance Use Risk  [x] Genetic Screening (cfDNA):  Declines   [x] First Trimester Anatomy Screen (11-13.6 wks): completed 25, no abnormalities  [x] Baby ASA: Recommended - pt declines   [x] Pregnancy dated by: LMP c/w 9 week USN     [] Anatomy US: (19-20 wks): normal but incomplete --> f/u anatomy scheduled    [] Federal Sterilization consent signed (if indicated):  [] 1hr GCT at 24-28wks: <> plans to do 7 days of BG checks   [] Fetal Surveillance (if indicated):  [] Tdap (27-32 wks, may be given up to 36 wks if initial window missed):   [] RSV (32-36 wks) (Sept. to end of ):     [] Feeding Intentions:  [] Postpartum Birth control method:   [] GBS at 36 - 37 wks:  [] 39 weeks discussion of IOL vs. Expectant management:   [] Mode of delivery ( anticipated ): repeat C/S           History of 2   sections    Overview Signed 2025  4:44 PM by Sherly Barba MD   <> schedule RCS          Venous malformation ([facility])    Overview Signed 2025  4:45 PM by Sherly Barba MD   - right labia majora   - saw vascular - will likely pursue treatment once she has completed childbearing          Rubella non-immune status, antepartum ([facility])        Follow up in 4 week(s).

## 2025-08-11 ENCOUNTER — HOSPITAL ENCOUNTER (OUTPATIENT)
Dept: RADIOLOGY | Facility: HOSPITAL | Age: 35
Discharge: HOME | End: 2025-08-11
Payer: COMMERCIAL

## 2025-08-11 DIAGNOSIS — Z3A.09 9 WEEKS GESTATION OF PREGNANCY (HHS-HCC): ICD-10-CM

## 2025-08-11 PROCEDURE — 76816 OB US FOLLOW-UP PER FETUS: CPT | Performed by: MEDICAL GENETICS

## 2025-08-11 PROCEDURE — 76816 OB US FOLLOW-UP PER FETUS: CPT

## 2025-08-13 ENCOUNTER — TELEPHONE (OUTPATIENT)
Dept: DERMATOLOGY | Facility: CLINIC | Age: 35
End: 2025-08-13
Payer: COMMERCIAL

## 2025-08-13 DIAGNOSIS — L71.9 ROSACEA: ICD-10-CM

## 2025-08-26 ENCOUNTER — APPOINTMENT (OUTPATIENT)
Dept: PRIMARY CARE | Facility: CLINIC | Age: 35
End: 2025-08-26
Payer: COMMERCIAL

## 2025-08-26 VITALS
RESPIRATION RATE: 16 BRPM | HEART RATE: 97 BPM | BODY MASS INDEX: 23.85 KG/M2 | SYSTOLIC BLOOD PRESSURE: 103 MMHG | WEIGHT: 170.4 LBS | DIASTOLIC BLOOD PRESSURE: 70 MMHG | HEIGHT: 71 IN

## 2025-08-26 DIAGNOSIS — Z00.00 ENCOUNTER FOR PREVENTATIVE ADULT HEALTH CARE EXAMINATION: Primary | ICD-10-CM

## 2025-08-26 PROCEDURE — 99385 PREV VISIT NEW AGE 18-39: CPT | Performed by: INTERNAL MEDICINE

## 2025-08-26 PROCEDURE — 3008F BODY MASS INDEX DOCD: CPT | Performed by: INTERNAL MEDICINE

## 2025-08-26 PROCEDURE — 1036F TOBACCO NON-USER: CPT | Performed by: INTERNAL MEDICINE

## 2025-08-26 ASSESSMENT — PATIENT HEALTH QUESTIONNAIRE - PHQ9
SUM OF ALL RESPONSES TO PHQ9 QUESTIONS 1 AND 2: 0
2. FEELING DOWN, DEPRESSED OR HOPELESS: NOT AT ALL
1. LITTLE INTEREST OR PLEASURE IN DOING THINGS: NOT AT ALL

## 2025-08-26 ASSESSMENT — ENCOUNTER SYMPTOMS
HEADACHES: 0
CONSTIPATION: 1
ROS GI COMMENTS: REFLUX
DIARRHEA: 0

## 2025-08-26 ASSESSMENT — PAIN SCALES - GENERAL: PAINLEVEL_OUTOF10: 0-NO PAIN

## 2025-08-29 ENCOUNTER — HOSPITAL ENCOUNTER (OUTPATIENT)
Dept: RADIOLOGY | Facility: HOSPITAL | Age: 35
Discharge: HOME | End: 2025-08-29
Payer: COMMERCIAL

## 2025-08-29 DIAGNOSIS — Z3A.09 9 WEEKS GESTATION OF PREGNANCY (HHS-HCC): ICD-10-CM

## 2025-08-29 PROCEDURE — 76816 OB US FOLLOW-UP PER FETUS: CPT

## 2025-09-12 ENCOUNTER — APPOINTMENT (OUTPATIENT)
Dept: OBSTETRICS AND GYNECOLOGY | Facility: CLINIC | Age: 35
End: 2025-09-12
Payer: COMMERCIAL

## 2025-09-23 ENCOUNTER — APPOINTMENT (OUTPATIENT)
Facility: CLINIC | Age: 35
End: 2025-09-23
Payer: COMMERCIAL

## 2025-10-07 ENCOUNTER — APPOINTMENT (OUTPATIENT)
Dept: OBSTETRICS AND GYNECOLOGY | Facility: CLINIC | Age: 35
End: 2025-10-07
Payer: COMMERCIAL

## 2025-10-20 ENCOUNTER — APPOINTMENT (OUTPATIENT)
Dept: OBSTETRICS AND GYNECOLOGY | Facility: CLINIC | Age: 35
End: 2025-10-20
Payer: COMMERCIAL

## 2025-11-05 ENCOUNTER — APPOINTMENT (OUTPATIENT)
Dept: OBSTETRICS AND GYNECOLOGY | Facility: CLINIC | Age: 35
End: 2025-11-05
Payer: COMMERCIAL

## 2025-11-18 ENCOUNTER — APPOINTMENT (OUTPATIENT)
Dept: OBSTETRICS AND GYNECOLOGY | Facility: CLINIC | Age: 35
End: 2025-11-18
Payer: COMMERCIAL

## 2025-11-25 ENCOUNTER — APPOINTMENT (OUTPATIENT)
Dept: OBSTETRICS AND GYNECOLOGY | Facility: CLINIC | Age: 35
End: 2025-11-25
Payer: COMMERCIAL

## 2026-01-28 ENCOUNTER — APPOINTMENT (OUTPATIENT)
Dept: DERMATOLOGY | Facility: CLINIC | Age: 36
End: 2026-01-28
Payer: COMMERCIAL

## 2026-06-12 ENCOUNTER — APPOINTMENT (OUTPATIENT)
Dept: PRIMARY CARE | Facility: CLINIC | Age: 36
End: 2026-06-12
Payer: COMMERCIAL